# Patient Record
Sex: FEMALE | Race: WHITE
[De-identification: names, ages, dates, MRNs, and addresses within clinical notes are randomized per-mention and may not be internally consistent; named-entity substitution may affect disease eponyms.]

---

## 2020-01-17 ENCOUNTER — HOSPITAL ENCOUNTER (EMERGENCY)
Dept: HOSPITAL 95 - ER | Age: 60
Discharge: HOME | End: 2020-01-17
Payer: COMMERCIAL

## 2020-01-17 VITALS — WEIGHT: 117.99 LBS | BODY MASS INDEX: 20.91 KG/M2 | HEIGHT: 63 IN

## 2020-01-17 DIAGNOSIS — L23.7: Primary | ICD-10-CM

## 2021-08-22 ENCOUNTER — HOSPITAL ENCOUNTER (EMERGENCY)
Dept: HOSPITAL 95 - ER | Age: 61
LOS: 1 days | Discharge: HOME | End: 2021-08-23
Payer: COMMERCIAL

## 2021-08-22 VITALS — HEIGHT: 64 IN | WEIGHT: 114.99 LBS | BODY MASS INDEX: 19.63 KG/M2

## 2021-08-22 DIAGNOSIS — L25.9: Primary | ICD-10-CM

## 2021-08-22 PROCEDURE — A9270 NON-COVERED ITEM OR SERVICE: HCPCS

## 2022-11-20 ENCOUNTER — HOSPITAL ENCOUNTER (INPATIENT)
Dept: HOSPITAL 95 - ER | Age: 62
LOS: 1 days | Discharge: ANOTHER HOSPITAL | DRG: 304 | End: 2022-11-21
Attending: INTERNAL MEDICINE | Admitting: STUDENT IN AN ORGANIZED HEALTH CARE EDUCATION/TRAINING PROGRAM
Payer: COMMERCIAL

## 2022-11-20 VITALS — HEIGHT: 68 IN | BODY MASS INDEX: 18.68 KG/M2 | WEIGHT: 123.24 LBS

## 2022-11-20 DIAGNOSIS — R40.20: ICD-10-CM

## 2022-11-20 DIAGNOSIS — B95.61: ICD-10-CM

## 2022-11-20 DIAGNOSIS — Z20.822: ICD-10-CM

## 2022-11-20 DIAGNOSIS — F15.10: ICD-10-CM

## 2022-11-20 DIAGNOSIS — Z66: ICD-10-CM

## 2022-11-20 DIAGNOSIS — I61.8: ICD-10-CM

## 2022-11-20 DIAGNOSIS — I16.1: Primary | ICD-10-CM

## 2022-11-20 DIAGNOSIS — Z51.5: ICD-10-CM

## 2022-11-20 DIAGNOSIS — G91.9: ICD-10-CM

## 2022-11-20 LAB
ALBUMIN SERPL BCP-MCNC: 4.1 G/DL (ref 3.4–5)
ALBUMIN/GLOB SERPL: 1 {RATIO} (ref 0.8–1.8)
ALT SERPL W P-5'-P-CCNC: 44 U/L (ref 12–78)
AMPHETAMINES UR SCN-MCNC: DETECTED NG/ML
AMPHETAMINES UR-MCNC: DETECTED UG/L
ANION GAP SERPL CALCULATED.4IONS-SCNC: 5 MMOL/L (ref 6–16)
AST SERPL W P-5'-P-CCNC: 39 U/L (ref 12–37)
BASOPHILS # BLD AUTO: 0.03 K/MM3 (ref 0–0.23)
BASOPHILS NFR BLD AUTO: 0 % (ref 0–2)
BILIRUB SERPL-MCNC: 0.3 MG/DL (ref 0.1–1)
BUN SERPL-MCNC: 21 MG/DL (ref 8–24)
CALCIUM SERPL-MCNC: 8.9 MG/DL (ref 8.5–10.1)
CHLORIDE SERPL-SCNC: 107 MMOL/L (ref 98–108)
CO2 SERPL-SCNC: 28 MMOL/L (ref 21–32)
CREAT SERPL-MCNC: 0.63 MG/DL (ref 0.4–1)
DEPRECATED RDW RBC AUTO: 41.2 FL (ref 35.1–46.3)
EOSINOPHIL # BLD AUTO: 0.02 K/MM3 (ref 0–0.68)
EOSINOPHIL NFR BLD AUTO: 0 % (ref 0–6)
ERYTHROCYTE [DISTWIDTH] IN BLOOD BY AUTOMATED COUNT: 12.4 % (ref 11.7–14.2)
FLUAV RNA SPEC QL NAA+PROBE: NEGATIVE
FLUBV RNA SPEC QL NAA+PROBE: NEGATIVE
GLOBULIN SER CALC-MCNC: 4.3 G/DL (ref 2.2–4)
GLUCOSE SERPL-MCNC: 208 MG/DL (ref 70–99)
GLUCOSE UR-MCNC: (no result) MG/DL
HCT VFR BLD AUTO: 40.8 % (ref 33–51)
HGB BLD-MCNC: 13.1 G/DL (ref 11.5–16)
IMM GRANULOCYTES # BLD AUTO: 0.05 K/MM3 (ref 0–0.1)
IMM GRANULOCYTES NFR BLD AUTO: 0 % (ref 0–1)
LEUKOCYTE ESTERASE UR QL STRIP: (no result)
LYMPHOCYTES # BLD AUTO: 1.17 K/MM3 (ref 0.84–5.2)
LYMPHOCYTES NFR BLD AUTO: 10 % (ref 21–46)
MCHC RBC AUTO-ENTMCNC: 32.1 G/DL (ref 31.5–36.5)
MCV RBC AUTO: 91 FL (ref 80–100)
MONOCYTES # BLD AUTO: 0.72 K/MM3 (ref 0.16–1.47)
MONOCYTES NFR BLD AUTO: 6 % (ref 4–13)
NEUTROPHILS # BLD AUTO: 10.35 K/MM3 (ref 1.96–9.15)
NEUTROPHILS NFR BLD AUTO: 84 % (ref 41–73)
NRBC # BLD AUTO: 0 K/MM3 (ref 0–0.02)
NRBC BLD AUTO-RTO: 0 /100 WBC (ref 0–0.2)
PH BLDV: 7.3 [PH] (ref 7.34–7.37)
PLATELET # BLD AUTO: 191 K/MM3 (ref 150–400)
POTASSIUM SERPL-SCNC: 2.6 MMOL/L (ref 3.5–5.5)
PROT SERPL-MCNC: 8.4 G/DL (ref 6.4–8.2)
PROT UR STRIP-MCNC: (no result) MG/DL
PROT UR STRIP-MCNC: (no result) MG/DL
RBC #/AREA URNS HPF: (no result) /HPF (ref 0–2)
RBC #/AREA URNS HPF: (no result) /HPF (ref 0–2)
RSV RNA SPEC QL NAA+PROBE: NEGATIVE
SARS-COV-2 RNA RESP QL NAA+PROBE: NEGATIVE
SODIUM SERPL-SCNC: 140 MMOL/L (ref 136–145)
SP GR SPEC: 1 (ref 1–1.02)
SP GR SPEC: 1.01 (ref 1–1.02)
URN SPEC COLLECT METH UR: (no result)
UROBILINOGEN UR STRIP-MCNC: (no result) MG/DL
UROBILINOGEN UR STRIP-MCNC: (no result) MG/DL
WBC #/AREA URNS HPF: (no result) /HPF (ref 0–5)

## 2022-11-20 PROCEDURE — P9047 ALBUMIN (HUMAN), 25%, 50ML: HCPCS

## 2022-11-20 PROCEDURE — G0378 HOSPITAL OBSERVATION PER HR: HCPCS

## 2022-11-20 PROCEDURE — A9270 NON-COVERED ITEM OR SERVICE: HCPCS

## 2022-11-20 PROCEDURE — C9113 INJ PANTOPRAZOLE SODIUM, VIA: HCPCS

## 2022-11-20 NOTE — NUR
ASSUMED CARE/BEGINING OF SHIFT
 
ASSUMED CARE AT 1900. PT INTUBATED W/ NO SEDATION. AC/VC 16/375/5/40% RR 19.
LUNGS SOUNDS COARSE. SMALL AMOUNT OF TAN THICK SECRETIONS SUCTIONED FROM ETT.
OGT TO LIS. PUPILS FIXED, AND CORNEAL REFLEX ABSENT. NO COUGH, GAG, OR
SWALLOW. NO RESPONSE TO NOXIOUS STIMULI IN BUE. PT INTERMITTENTLY MOVING
BLE WITHOUT STIMULUS. MINIMAL MOVEMENT IN BLE AT TIMES TO NOXIOUS STIMULI, NO
RESPONSE AT OTHER TIMES.
 
[facility] REP ARRIVED APPROX. 1930. REQUESTING LABS TO BE DRAWN FOR
ORGAN DONATION APPROVAL. DR KAISER CALLED REGARDING NEED FOR LINE PLACEMENT D/T
AMOUNT OF BLOOD NEEDED. DR KAISER ATTEMPTED PLACEMENT IN L RADIAL. TR BAND
APPLIED. SITE SOFT, NO S/S BLEEDING NOTED. DR KAISER PLACED ART LINE IN L
FEMORAL. BLOOD DRAWN AND GIVEN TO [facility] REP.
 
CURRENTLY IN SR 90'S. MAP GREATER THAN 65 AND SBP GREATER THAN 95. LEVOPHED
GTT AT 5MCG/MIN. SPO2 GREATER THAN 90%.

## 2022-11-20 NOTE — NUR
Met with pt's son-Bam, Nelia and pt's brother, Braxton in consult room to
review their understanding of pt's condition, prognosis and plan going
forward. They have other family members who suffered demise due to
intracranial bleed and have had some conversations with their mom about what
she would want if she also suffered a similar event. Family understand that pt
is not able to recover from this.  They are waiting for one additional family
member to arrive, Pt's sister-in-law, & then they would like her medicated for
comfort and extubated. Lilliana states she does not want to leave her mom
like this any longer than necessary.  Time spent answering their
questions, supportive listening and participating in life review with them.
They deny desire for pastoral care visit at this time. Family aware that pt's
HR is slowing and that she may have cessation of heart function prior to
extubation.  Family calm, appropriately greiving and tearful. They are
supportive of one another and asking appropriate questions about their own
health and risks for similar events due to multiple family members suffering
life ending CVA's.

## 2022-11-20 NOTE — NUR
ADMIT
PT ARRIVED TO ICU 13 VIA BED AT 1415. PT IS INTUBATED AND UNRESPONSIVE UPON
ARRIVAL. PT UNRESPONSIVE TO NOXIOUS STIMULI. PT WITHOUT COUGH OR GAG. PUPILS
6MM AND FIXED. PT WITHOUT COUGH OR GAG REFLEX. VENT SETTINGS AC 16, ,
PEEP 5, FIO2 40%. OGT PLACED TO LIS WITH LARGE AMOUNT OF BILE/COFFEE GROUND
OUTPUT NOTED. PERIPHERAL IV'S IN PLACE, SALINE LOCKED. CARDENAS IN PLACE WITH
SCANT AMOUNT OF YELLOW URINE OUTPUT NOTED. [facility] CALLED AND
UPDATED WITH CURRENT PT CONDITION. RECTAL TEMP PROBE PLACED, PT INITIAL TEMP
91.5F. WARMING BLANKET APPLIED. PT INTERMITENTLY HYPOTENSIVE. DR PELAEZ
NOTIFIED. OK TO START PERIPHERAL LEVOPHED IF NEEDED PER DR PELAEZ. WILL
CONTINUE TO MONITOR.

## 2022-11-20 NOTE — NUR
Initial Pal Care visit to ER
Pt intubated and with warming bear hugger device on. Bruising/redness noted to
left temple and left shoulder. Pt is unresponsive. Update obtained from RN &
, who just gave update to pt's son, Bam, including prognosis that this
event is not survivable. RN to call me when pt's children and brother arrive
to the unit.

## 2022-11-20 NOTE — NUR
SHIFT SUMMARY
NO ACUTE CHANGES IN NEURO STATUS. VENT SETTINGS REMAINS UNCHANGED. LEVOPHED
INFUSING AT 4 MCG/MIN THROUGH R AC IV. [facility] IN ROUTE. DR PELAEZ NOTIFIED. CONSULT FOR INTENSIVIST RECIEVED. DR KAISER NOTIFIED OF
CONSULT. NO ORDERS RECIEVED. WILL CONTINUE TO MONITOR AND REPORT OFF TO
ONCOMING RN.

## 2022-11-20 NOTE — NUR
Pt's children have decided on organ donation and are in agreement with
postponing extubation until donor team is ready. Bam and Lilliana plan to spend
the rest of the day at home but want to be notified of changes and be able to
come back to see their mom prior to extubation/OR for harvesing organs.  They
are aware that pt's condition may deteriorate and she may die prior to
extubation. They want pt medicated for comfort if needed and to ensure that
she is not suffering in any way. Pal Care to cont to maintain contact with
family for support.

## 2022-11-21 LAB
ALBUMIN SERPL BCP-MCNC: 2.3 G/DL (ref 3.4–5)
ALBUMIN SERPL BCP-MCNC: 2.6 G/DL (ref 3.4–5)
ALBUMIN SERPL BCP-MCNC: 2.9 G/DL (ref 3.4–5)
ALBUMIN/GLOB SERPL: 0.7 {RATIO} (ref 0.8–1.8)
ALBUMIN/GLOB SERPL: 0.9 {RATIO} (ref 0.8–1.8)
ALT SERPL W P-5'-P-CCNC: 34 U/L (ref 12–78)
ALT SERPL W P-5'-P-CCNC: 35 U/L (ref 12–78)
ANION GAP SERPL CALCULATED.4IONS-SCNC: 7 MMOL/L (ref 6–16)
ANION GAP SERPL CALCULATED.4IONS-SCNC: 7 MMOL/L (ref 6–16)
ANION GAP SERPL CALCULATED.4IONS-SCNC: 9 MMOL/L (ref 6–16)
AST SERPL W P-5'-P-CCNC: 41 U/L (ref 12–37)
AST SERPL W P-5'-P-CCNC: 49 U/L (ref 12–37)
BASOPHILS # BLD AUTO: 0.07 K/MM3 (ref 0–0.23)
BASOPHILS # BLD: 0.35 K/MM3 (ref 0–0.23)
BASOPHILS NFR BLD AUTO: 0 % (ref 0–2)
BASOPHILS NFR BLD: 2 % (ref 0–2)
BILIRUB DIRECT SERPL-MCNC: 0.1 MG/DL (ref 0–0.3)
BILIRUB DIRECT SERPL-MCNC: 0.2 MG/DL (ref 0–0.3)
BILIRUB INDIRECT SERPL-MCNC: 0.5 MG/DL (ref 0.1–0.7)
BILIRUB INDIRECT SERPL-MCNC: 0.7 MG/DL (ref 0.1–0.7)
BILIRUB SERPL-MCNC: 0.6 MG/DL (ref 0.1–1)
BILIRUB SERPL-MCNC: 0.9 MG/DL (ref 0.1–1)
BUN SERPL-MCNC: 35 MG/DL (ref 8–24)
BUN SERPL-MCNC: 38 MG/DL (ref 8–24)
BUN SERPL-MCNC: 42 MG/DL (ref 8–24)
CALCIUM SERPL-MCNC: 8.1 MG/DL (ref 8.5–10.1)
CALCIUM SERPL-MCNC: 8.3 MG/DL (ref 8.5–10.1)
CALCIUM SERPL-MCNC: 8.5 MG/DL (ref 8.5–10.1)
CHLORIDE SERPL-SCNC: 110 MMOL/L (ref 98–108)
CHLORIDE SERPL-SCNC: 115 MMOL/L (ref 98–108)
CHLORIDE SERPL-SCNC: 117 MMOL/L (ref 98–108)
CO2 SERPL-SCNC: 21 MMOL/L (ref 21–32)
CO2 SERPL-SCNC: 24 MMOL/L (ref 21–32)
CO2 SERPL-SCNC: 25 MMOL/L (ref 21–32)
CREAT SERPL-MCNC: 1.93 MG/DL (ref 0.4–1)
CREAT SERPL-MCNC: 2.27 MG/DL (ref 0.4–1)
CREAT SERPL-MCNC: 2.55 MG/DL (ref 0.4–1)
DEPRECATED RDW RBC AUTO: 40.6 FL (ref 35.1–46.3)
DEPRECATED RDW RBC AUTO: 42.7 FL (ref 35.1–46.3)
EOSINOPHIL # BLD AUTO: 0.09 K/MM3 (ref 0–0.68)
EOSINOPHIL # BLD: 0 K/MM3 (ref 0–0.68)
EOSINOPHIL NFR BLD AUTO: 1 % (ref 0–6)
EOSINOPHIL NFR BLD: 0 % (ref 0–6)
ERYTHROCYTE [DISTWIDTH] IN BLOOD BY AUTOMATED COUNT: 12.8 % (ref 11.7–14.2)
ERYTHROCYTE [DISTWIDTH] IN BLOOD BY AUTOMATED COUNT: 13.2 % (ref 11.7–14.2)
GLOBULIN SER CALC-MCNC: 3.4 G/DL (ref 2.2–4)
GLOBULIN SER CALC-MCNC: 3.7 G/DL (ref 2.2–4)
GLUCOSE SERPL-MCNC: 122 MG/DL (ref 70–99)
GLUCOSE SERPL-MCNC: 126 MG/DL (ref 70–99)
GLUCOSE SERPL-MCNC: 99 MG/DL (ref 70–99)
HCT VFR BLD AUTO: 32.4 % (ref 33–51)
HCT VFR BLD AUTO: 35.1 % (ref 33–51)
HGB BLD-MCNC: 11.3 G/DL (ref 11.5–16)
HGB BLD-MCNC: 11.8 G/DL (ref 11.5–16)
IMM GRANULOCYTES # BLD AUTO: 0.11 K/MM3 (ref 0–0.1)
IMM GRANULOCYTES NFR BLD AUTO: 1 % (ref 0–1)
LEUKOCYTE ESTERASE UR QL STRIP: (no result)
LYMPHOCYTES # BLD AUTO: 1.72 K/MM3 (ref 0.84–5.2)
LYMPHOCYTES # BLD: 1.61 K/MM3 (ref 0.84–5.2)
LYMPHOCYTES NFR BLD AUTO: 9 % (ref 21–46)
LYMPHOCYTES NFR BLD: 9 % (ref 21–46)
MAGNESIUM SERPL-MCNC: 2 MG/DL (ref 1.6–2.4)
MAGNESIUM SERPL-MCNC: 2.1 MG/DL (ref 1.6–2.4)
MAGNESIUM SERPL-MCNC: 2.8 MG/DL (ref 1.6–2.4)
MCHC RBC AUTO-ENTMCNC: 33.6 G/DL (ref 31.5–36.5)
MCHC RBC AUTO-ENTMCNC: 34.9 G/DL (ref 31.5–36.5)
MCV RBC AUTO: 87 FL (ref 80–100)
MCV RBC AUTO: 89 FL (ref 80–100)
MONOCYTES # BLD AUTO: 0.96 K/MM3 (ref 0.16–1.47)
MONOCYTES # BLD: 0.53 K/MM3 (ref 0.16–1.47)
MONOCYTES NFR BLD AUTO: 5 % (ref 4–13)
MONOCYTES NFR BLD: 3 % (ref 4–13)
NEUTROPHILS # BLD AUTO: 16.05 K/MM3 (ref 1.96–9.15)
NEUTROPHILS NFR BLD AUTO: 84 % (ref 41–73)
NEUTS BAND NFR BLD MANUAL: 12 % (ref 0–8)
NEUTS SEG # BLD MANUAL: 15.45 K/MM3 (ref 1.96–9.15)
NEUTS SEG NFR BLD MANUAL: 74 % (ref 41–73)
NRBC # BLD AUTO: 0 K/MM3 (ref 0–0.02)
NRBC # BLD AUTO: 0 K/MM3 (ref 0–0.02)
NRBC BLD AUTO-RTO: 0 /100 WBC (ref 0–0.2)
NRBC BLD AUTO-RTO: 0 /100 WBC (ref 0–0.2)
PH BLDA: 7.25 [PH] (ref 7.35–7.45)
PH BLDA: 7.28 [PH] (ref 7.35–7.45)
PH BLDA: 7.33 [PH] (ref 7.35–7.45)
PH BLDA: 7.33 [PH] (ref 7.35–7.45)
PH BLDA: 7.43 [PH] (ref 7.35–7.45)
PH BLDA: 7.45 [PH] (ref 7.35–7.45)
PHOSPHATE SERPL-MCNC: 1.6 MG/DL (ref 2.5–4.9)
PHOSPHATE SERPL-MCNC: 3.9 MG/DL (ref 2.5–4.9)
PHOSPHATE SERPL-MCNC: 4.6 MG/DL (ref 2.5–4.9)
PLATELET # BLD AUTO: 190 K/MM3 (ref 150–400)
PLATELET # BLD AUTO: 215 K/MM3 (ref 150–400)
POTASSIUM SERPL-SCNC: 3.4 MMOL/L (ref 3.5–5.5)
POTASSIUM SERPL-SCNC: 3.4 MMOL/L (ref 3.5–5.5)
POTASSIUM SERPL-SCNC: 4.3 MMOL/L (ref 3.5–5.5)
PROT SERPL-MCNC: 6.3 G/DL (ref 6.4–8.2)
PROT SERPL-MCNC: 6.3 G/DL (ref 6.4–8.2)
PROT UR STRIP-MCNC: (no result) MG/DL
PROTHROMBIN TIME: 12.2 SEC (ref 9.7–11.5)
SODIUM SERPL-SCNC: 143 MMOL/L (ref 136–145)
SODIUM SERPL-SCNC: 145 MMOL/L (ref 136–145)
SODIUM SERPL-SCNC: 147 MMOL/L (ref 136–145)
SP GR SPEC: 1.01 (ref 1–1.02)
TOTAL CELLS COUNTED BLD: 100
URN SPEC COLLECT METH UR: (no result)
UROBILINOGEN UR STRIP-MCNC: (no result) MG/DL

## 2022-11-21 PROCEDURE — 0BH17EZ INSERTION OF ENDOTRACHEAL AIRWAY INTO TRACHEA, VIA NATURAL OR ARTIFICIAL OPENING: ICD-10-PCS | Performed by: INTERNAL MEDICINE

## 2022-11-21 PROCEDURE — 04HY32Z INSERTION OF MONITORING DEVICE INTO LOWER ARTERY, PERCUTANEOUS APPROACH: ICD-10-PCS | Performed by: INTERNAL MEDICINE

## 2022-11-21 PROCEDURE — 3E033XZ INTRODUCTION OF VASOPRESSOR INTO PERIPHERAL VEIN, PERCUTANEOUS APPROACH: ICD-10-PCS | Performed by: INTERNAL MEDICINE

## 2022-11-21 PROCEDURE — 4A133J1 MONITORING OF ARTERIAL PULSE, PERIPHERAL, PERCUTANEOUS APPROACH: ICD-10-PCS | Performed by: INTERNAL MEDICINE

## 2022-11-21 PROCEDURE — 4A133B1 MONITORING OF ARTERIAL PRESSURE, PERIPHERAL, PERCUTANEOUS APPROACH: ICD-10-PCS | Performed by: INTERNAL MEDICINE

## 2022-11-21 PROCEDURE — 5A09357 ASSISTANCE WITH RESPIRATORY VENTILATION, LESS THAN 24 CONSECUTIVE HOURS, CONTINUOUS POSITIVE AIRWAY PRESSURE: ICD-10-PCS | Performed by: INTERNAL MEDICINE

## 2022-11-21 NOTE — NUR
Comfort care visit. Pt unchanged from assessment yesterday in ER. EMR
reviewed. Pt remains ventilated, unresponsive to painful stimuli and other
reflexes. Transplant team expected today. Son, Bam, updated on status and
anticipated timeframe for team to begin evaluating pt for organ donation. Pt
does not show any nonverbal indicators of suffering, pain, anxiety or
distress.

## 2022-11-21 NOTE — NUR
SHIFT SUMMARY/TRANSFER
 
PT REMAINS INTUBATED W/ NO SEDATION AND UNRESTRAINED. VENT SETTINGS AC/VC
14/400/5/70% W/ SPO2 GREATER THAN 90%. RR 14. PT HAD NO NEURO RESPONSES OR
REFLEXES. [facility] TRANSPORT ARRIVED AT 2138. REPORT GIVEN BY DONOR TEAM,
JEFFRY CARD, AND THIS RN. PT TRANSFERED TO [facility] VIA LIFT W/O DIFFICULTY.
EXTRA LEVOPHED, LEVOTHYROXINE, NS W/ 20MEQ POTASSIUM AND VASOPRESSION SENT
WITH CCT. PATIENT BELONGINGS SENT WITH CCT. PT OUT OF ROOM AT 2230.

## 2022-11-21 NOTE — NUR
SHIFT SUMMARY....
 
THE PT'S CARE HAS BEEN GUIDED BY [facility] ALONG WITH HOSPITAL
PROVIDERS. THE PT'S BP HAS BEEN VERY LABILE ALL SHIFT ONE MOMENT ON LEVOPHED
AT 16MCG WITH MAPS <60 THE NEXT SBPs IN 'S. PROVIDER'S AWARE AND
LEVOPHED HAS BEEN TITRATED AS NEEDED. POSITIVE APNEA TRIAL WAS DONE AT 1243.
[facility] PT'S URINARY OUTPUT FOR THIS SHIFT HAS GREATLY DECLINED, [facility]
AND PROVIDERS AWARE. BEDSIDE BRONCH WAS DONE BY DR. KAISER, THE PT'S ET TUBE
WAS REPOSITIONED AFTER THE BRONCH WAS DONE TO 22 AT THE TEETH. OG TUBE WAS
ADVANCED 8CM PER DR. KAISER'S ORDERS, XRAY WAS DONE TO CHECK PLACEMENT. THE PT
HAS NOT HAD A BM THIS SHIFT.
 
[facility] IS COORDINATING TRANSPORT FOR THE PT UP TO THEIR
[place] [facility] FOR HARVEST.
 
1730 FAMILY AT THE BEDSIDE, [facility]  AT THE BEDSIDE
WITH THE FAMILY TO ANSWER QUESTIONS.
 
WILL CONTINUE TO MONITOR UNTIL REPORT IS GIVEN TO ONCOMING RN.

## 2022-11-21 NOTE — NUR
SHIFT SUMMARY
 
PT REMAINS INTUBATED AND UNRESTRAINED. CURRENT VENT SETTINGS 14/375/5/30%
RR 14. NO COUGH, GAG, OR SWALLOW. ONE EPISODE OF BREATHING OVER THE VENT RR
15-16 WITH STIMULI THIS AM. PUPILS UNEQUEL, RIGHT 4MM/ LEFT 5M, BOTH FIXED AND
NONRESPONSIVE. PT CONTINUES TO HAVE SPONTANEOUS MOVEMENT AT TIMES IN BLE. PT
WILL MOVE WITH FIRST STIMULATION, DECREASED/ABSENT MOVEMENT WITH ADDITIONAL
STIMULI. SR/ST 'S. ARTERIAL LINE SBP 'S. PT HYPERTENSIVE AFTER
TURNS UP 'S, SLOWLY RECOVERS TO BASELINE. OGT TO LIS, RED/BROWN OUTPUT
W/ SEDIMENT. CARDENAS PATENT AND DRAINING TO GRAVITY. 140ML OUTPUT THIS SHIFT. DR CRAMER CALLED REGARDING PHOS 1.6 AND POTASSIUM 3.4. ORDER RECEIVED.

## 2022-11-21 NOTE — NUR
Per communication with son t/o the day, Family plans to come visit pt this teto
one last time before she is transferred to [facility] for organ donation.

## 2022-11-21 NOTE — NUR
ASSUMED CARE
PT IS INTUBATED ON 14/375/5/30%; SPO2 >92%. MAP >65; BP EXTREMELY LABILE; SBP
IN THE 'S AT REST AND UP -190 W/ INTERVENTION. LEVOPHED GTT AT
8MCG. TEMP: 97.6. COARSE LUNG SOUNDS; OFFGOING RN NOTED THAT O2 SATS WENT FROM
>97% TO 92~% W/ SAME VENT SETTINGS OVER NIGHT. MINIMAL URINE OUTPUT NOTED FOR
OFFGOING RN.
 
NEURO: NO RESPONSE TO ANY PAINFUL/NOXIOUS STIMULI. SOME INTERMITTENT TWITCHING
IN RIGHT FOOT NOTED THIS AM (NOT NEW) AND POSITIVE BABINSKI'S REFLEX WITH
FLEXION/SUPINATION. NO DOLLS EYE/CORNEAL REFLEX. PUPILS FIXED W/ RIGHT 3MM AND
LEFT 5MM. NO GAG OR COUGH NOTED.

## 2022-11-21 NOTE — NUR
UPDATE
APNEA TEST INITIATED AT 1220 W/ 100% O2. 1232 APNEA INITIATED VENTILATOR
DISCONNECTED. 1241 ABG TAKEN AND VENTILATOR RECONNECTED.
 
NO RESPIRATORY EFFORT MADE BY PATIENT

## 2022-11-21 NOTE — NUR
ASSUMED CARE
 
PT INTUBATED W/ NO SEDATION AND UNRESTRAINED. VENT SETTINGS AC/VC
14/400/5/100%. RR 14. SPO2 100%. LEVOPHED, LEVOTHYROXINE, KCL, NS W/ 20 MEQ
POTASSIUM, MAG SULFATE, DOXYCYCLINE, AND FLAGYL INFUSING. ART LINE IN PLACE TO
LEFT FEMORAL. ABP LABILE. CARDENAS PATENT AND CLAMPED FOR UA COLLECTION. OGT TO
LIS. BEDSIDE REPORT COMPLETED W/ RU RN AND EL RN.